# Patient Record
Sex: MALE | Race: BLACK OR AFRICAN AMERICAN | Employment: FULL TIME | URBAN - METROPOLITAN AREA
[De-identification: names, ages, dates, MRNs, and addresses within clinical notes are randomized per-mention and may not be internally consistent; named-entity substitution may affect disease eponyms.]

---

## 2019-08-10 ENCOUNTER — HOSPITAL ENCOUNTER (EMERGENCY)
Age: 29
Discharge: HOME | End: 2019-08-10
Attending: EMERGENCY MEDICINE
Payer: SELF-PAY

## 2019-08-10 ENCOUNTER — APPOINTMENT (OUTPATIENT)
Dept: GENERAL RADIOLOGY | Age: 29
End: 2019-08-10
Attending: EMERGENCY MEDICINE
Payer: SELF-PAY

## 2019-08-10 VITALS
HEIGHT: 67 IN | HEART RATE: 82 BPM | OXYGEN SATURATION: 98 % | WEIGHT: 169 LBS | DIASTOLIC BLOOD PRESSURE: 76 MMHG | SYSTOLIC BLOOD PRESSURE: 128 MMHG | TEMPERATURE: 98.4 F | RESPIRATION RATE: 14 BRPM | BODY MASS INDEX: 26.53 KG/M2

## 2019-08-10 DIAGNOSIS — J20.9 ACUTE BRONCHITIS, UNSPECIFIED ORGANISM: Primary | ICD-10-CM

## 2019-08-10 PROCEDURE — 71046 X-RAY EXAM CHEST 2 VIEWS: CPT

## 2019-08-10 PROCEDURE — 99282 EMERGENCY DEPT VISIT SF MDM: CPT

## 2019-08-10 RX ORDER — ALBUTEROL SULFATE 90 UG/1
2 AEROSOL, METERED RESPIRATORY (INHALATION)
Qty: 1 INHALER | Refills: 1 | Status: SHIPPED | OUTPATIENT
Start: 2019-08-10 | End: 2020-12-03

## 2019-08-10 RX ORDER — PREDNISONE 20 MG/1
60 TABLET ORAL DAILY
Qty: 15 TAB | Refills: 0 | Status: SHIPPED | OUTPATIENT
Start: 2019-08-10 | End: 2019-08-15

## 2019-08-10 NOTE — ED PROVIDER NOTES
EMERGENCY DEPARTMENT HISTORY AND PHYSICAL EXAM      Date: 8/10/2019  Patient Name: Pham Betts    History of Presenting Illness     Chief Complaint   Patient presents with    Cough     coughing x2 weeks, thought maybe seasonal allergies or new cat, concern when cough blood this morning       History Provided By: Patient    HPI: Pham Betts, 25 y.o. male with no significant PMHx, presents ambulatory to the ED with cc of a persistent, yellow-brown sputum productive cough with associated intermittent SOB x 2 weeks, with specks of BRB in his sputum this morning. Pt denies a hx of asthma. He denies any recent sick contact. Pt notes that he smokes tobacco daily and drinks EtOH occasionally, however denies drug use. He denies taking any OTC medications for his sx's. Pt denies any alleviating/exacerbating factors. He specifically denies any fever, chills, CP, HA, N/V/D, abdominal pain, or rash. There are no other complaints, changes, or physical findings at this time. Social Hx: + EtOH; + Smoker; - Illicit Drugs     PCP: Alex, MD Kirsty        Past History     Past Medical History:  History reviewed. No pertinent past medical history. Past Surgical History:  History reviewed. No pertinent surgical history. Family History:  History reviewed. No pertinent family history. Social History:  Social History     Tobacco Use    Smoking status: Not on file   Substance Use Topics    Alcohol use: Not on file    Drug use: Not on file       Allergies:  No Known Allergies    Review of Systems   Review of Systems   Constitutional: Negative for chills and fever. HENT: Negative for sore throat and trouble swallowing. Eyes: Negative for photophobia and redness. Respiratory: Positive for cough (blood in sputum this morning, 2 weeks of productive cough) and shortness of breath. Cardiovascular: Negative for chest pain and leg swelling.    Gastrointestinal: Negative for abdominal pain, constipation, diarrhea, nausea and vomiting. Endocrine: Negative for polydipsia and polyuria. Genitourinary: Negative for dysuria, hematuria and scrotal swelling. Musculoskeletal: Negative for back pain and joint swelling. Skin: Negative for rash. Neurological: Negative for dizziness, syncope, weakness and headaches. Psychiatric/Behavioral: Negative for suicidal ideas. All other systems reviewed and are negative. Physical Exam   Physical Exam   Constitutional: He is oriented to person, place, and time. He appears well-developed and well-nourished. HENT:   Head: Normocephalic and atraumatic. Mouth/Throat: Oropharynx is clear and moist and mucous membranes are normal.   Eyes: EOM are normal.   Neck: Normal range of motion and full passive range of motion without pain. Neck supple. Cardiovascular: Normal rate, regular rhythm, normal heart sounds, intact distal pulses and normal pulses. No murmur heard. Pulmonary/Chest: Effort normal. No respiratory distress. He has no wheezes. He has rhonchi. He exhibits no tenderness. Good air entry BL with some rhonchi   Abdominal: Soft. Normal appearance and bowel sounds are normal. There is no tenderness. There is no rebound and no guarding. Neurological: He is alert and oriented to person, place, and time. He has normal strength. Skin: Skin is warm, dry and intact. No rash noted. No erythema. Psychiatric: He has a normal mood and affect. His speech is normal and behavior is normal. Judgment and thought content normal.   Nursing note and vitals reviewed. Diagnostic Study Results     Labs -   No results found for this or any previous visit (from the past 12 hour(s)). Radiologic Studies -   XR CHEST PA LAT   Final Result   IMPRESSION:    Clear lungs. CT Results  (Last 48 hours)    None        CXR Results  (Last 48 hours)               08/10/19 1017  XR CHEST PA LAT Final result    Impression:  IMPRESSION:    Clear lungs.        Narrative:  PA AND LATERAL CHEST RADIOGRAPHS: 8/10/2019 10:17 AM       INDICATION: Cough. COMPARISON: None. TECHNIQUE: Frontal and lateral radiographs of the chest.       FINDINGS:   The lungs are clear. The central airways are patent. The heart size is normal.   No pneumothorax or pleural effusion. Medical Decision Making   I am the first provider for this patient. I reviewed the vital signs, available nursing notes, past medical history, past surgical history, family history and social history. Vital Signs-Reviewed the patient's vital signs. Patient Vitals for the past 12 hrs:   Temp Pulse Resp BP SpO2   08/10/19 0943 98.4 °F (36.9 °C) 82 14 128/76 98 %       Pulse Oximetry Analysis - 98% on RA    Cardiac Monitor:   Rate: 82 bpm  Rhythm: Normal Sinus Rhythm      Records Reviewed: Nursing Notes    Provider Notes (Medical Decision Making):   DDx: bronchitis, PNA, TB, lung CA    ED Course:   Initial assessment performed. The patients presenting problems have been discussed, and they are in agreement with the care plan formulated and outlined with them. I have encouraged them to ask questions as they arise throughout their visit. Critical Care Time:   None    Disposition:  Discharge Note:  10:26 AM  The patient has been re-evaluated and is ready for discharge. Reviewed available results with patient. Counseled patient/parent/guardian on diagnosis and care plan. Patient has expressed understanding, and all questions have been answered. Patient agrees with plan and agrees to follow up as recommended, or return to the ED if their symptoms worsen. Discharge instructions have been provided and explained to the patient, along with reasons to return to the ED. PLAN:  1.    Current Discharge Medication List      START taking these medications    Details   albuterol (PROVENTIL HFA, VENTOLIN HFA, PROAIR HFA) 90 mcg/actuation inhaler Take 2 Puffs by inhalation every four (4) hours as needed for Wheezing. Qty: 1 Inhaler, Refills: 1      predniSONE (DELTASONE) 20 mg tablet Take 60 mg by mouth daily for 5 days. Qty: 15 Tab, Refills: 0      dextromethorphan-guaiFENesin (ROBITUSSIN COUGH-CHEST CUATE DM)  mg/5 mL liqd syrup Take 10 mL by mouth every eight (8) hours as needed for Cough for up to 5 days. Qty: 1 Bottle, Refills: 0           2. Follow-up Information     Follow up With Specialties Details Why [facility] EMERGENCY DEPT Emergency Medicine In 1 week  [address] [address]  [phone]        Return to ED if worse     Diagnosis     Clinical Impression:   1. Acute bronchitis, unspecified organism        This note is prepared by Sofya Owen, acting as Scribe for MD Ivan Cano MD: The scribe's documentation has been prepared under my direction and personally reviewed by me in its entirety. I confirm that the note above accurately reflects all work, treatment, procedures, and medical decision making performed by me. This note will not be viewable in [address]

## 2019-08-10 NOTE — ED NOTES
Patient (s) 1 given copy of dc instructions and 2 paper script(s) and  electronic scripts. Patient (s)  verbalized understanding of instructions and script (s). Patient given a current medication reconciliation form and verbalized understanding of their medications. Patient (s) verbalized understanding of the importance of discussing medications with  his or her physician or clinic they will be following up with. Patient alert and oriented and in no acute distress.

## 2019-08-10 NOTE — ED NOTES
Patient presents to ED with c/o cough for 2 weeks. Current every day smoker. States that he was told he may have asthma but not on any medication. Emergency Department Nursing Plan of Care       The Nursing Plan of Care is developed from the Nursing assessment and Emergency Department Attending provider initial evaluation. The plan of care may be reviewed in the ED Provider note.     The Plan of Care was developed with the following considerations:   Patient / Family readiness to learn indicated by:verbalized understanding and successful return demonstration  Persons(s) to be included in education: patient  Barriers to Learning/Limitations:No    Signed     Tyler Grier RN    8/10/2019   10:04 AM

## 2019-08-10 NOTE — DISCHARGE INSTRUCTIONS
Patient Education        Bronchitis: Care Instructions  Your Care Instructions    Bronchitis is inflammation of the bronchial tubes, which carry air to the lungs. The tubes swell and produce mucus, or phlegm. The mucus and inflamed bronchial tubes make you cough. You may have trouble breathing. Most cases of bronchitis are caused by viruses like those that cause colds. Antibiotics usually do not help and they may be harmful. Bronchitis usually develops rapidly and lasts about 2 to 3 weeks in otherwise healthy people. Follow-up care is a key part of your treatment and safety. Be sure to make and go to all appointments, and call your doctor if you are having problems. It's also a good idea to know your test results and keep a list of the medicines you take. How can you care for yourself at home? · Take all medicines exactly as prescribed. Call your doctor if you think you are having a problem with your medicine. · Get some extra rest.  · Take an over-the-counter pain medicine, such as acetaminophen (Tylenol), ibuprofen (Advil, Motrin), or naproxen (Aleve) to reduce fever and relieve body aches. Read and follow all instructions on the label. · Do not take two or more pain medicines at the same time unless the doctor told you to. Many pain medicines have acetaminophen, which is Tylenol. Too much acetaminophen (Tylenol) can be harmful. · Take an over-the-counter cough medicine that contains dextromethorphan to help quiet a dry, hacking cough so that you can sleep. Avoid cough medicines that have more than one active ingredient. Read and follow all instructions on the label. · Breathe moist air from a humidifier, hot shower, or sink filled with hot water. The heat and moisture will thin mucus so you can cough it out. · Do not smoke. Smoking can make bronchitis worse. If you need help quitting, talk to your doctor about stop-smoking programs and medicines.  These can increase your chances of quitting for good.  When should you call for help? Call 911 anytime you think you may need emergency care. For example, call if:    · You have severe trouble breathing.    Call your doctor now or seek immediate medical care if:    · You have new or worse trouble breathing.     · You cough up dark brown or bloody mucus (sputum).     · You have a new or higher fever.     · You have a new rash.    Watch closely for changes in your health, and be sure to contact your doctor if:    · You cough more deeply or more often, especially if you notice more mucus or a change in the color of your mucus.     · You are not getting better as expected. Where can you learn more? Go to http://ama-martita.info/. Enter H333 in the search box to learn more about \"Bronchitis: Care Instructions. \"  Current as of: September 5, 2018  Content Version: 12.1  © 5034-3793 Healthwise, Incorporated. Care instructions adapted under license by [employer] (which disclaims liability or warranty for this information). If you have questions about a medical condition or this instruction, always ask your healthcare professional. [address] any warranty or liability for your use of this information.

## 2020-11-06 ENCOUNTER — HOSPITAL ENCOUNTER (EMERGENCY)
Age: 30
Discharge: HOME | End: 2020-11-06
Attending: EMERGENCY MEDICINE

## 2020-11-06 VITALS
WEIGHT: 171 LBS | RESPIRATION RATE: 14 BRPM | SYSTOLIC BLOOD PRESSURE: 119 MMHG | OXYGEN SATURATION: 99 % | DIASTOLIC BLOOD PRESSURE: 64 MMHG | HEART RATE: 71 BPM | TEMPERATURE: 98 F | HEIGHT: 67 IN | BODY MASS INDEX: 26.84 KG/M2

## 2020-11-06 DIAGNOSIS — L03.012 PARONYCHIA OF LEFT INDEX FINGER: Primary | ICD-10-CM

## 2020-11-06 PROCEDURE — 99283 EMERGENCY DEPT VISIT LOW MDM: CPT

## 2020-11-06 PROCEDURE — 75810000289 HC I&D ABSCESS SIMP/COMP/MULT

## 2020-11-06 PROCEDURE — 74011000250 HC RX REV CODE- 250: Performed by: PHYSICIAN ASSISTANT

## 2020-11-06 RX ORDER — IBUPROFEN 800 MG/1
800 TABLET ORAL
Qty: 20 TAB | Refills: 0 | Status: SHIPPED | OUTPATIENT
Start: 2020-11-06 | End: 2020-11-13

## 2020-11-06 RX ORDER — LIDOCAINE HYDROCHLORIDE 10 MG/ML
5 INJECTION, SOLUTION EPIDURAL; INFILTRATION; INTRACAUDAL; PERINEURAL
Status: COMPLETED | OUTPATIENT
Start: 2020-11-06 | End: 2020-11-06

## 2020-11-06 RX ORDER — CEPHALEXIN 500 MG/1
500 CAPSULE ORAL 4 TIMES DAILY
Qty: 28 CAP | Refills: 0 | Status: SHIPPED | OUTPATIENT
Start: 2020-11-06 | End: 2020-11-13

## 2020-11-06 RX ORDER — BUPIVACAINE HYDROCHLORIDE 5 MG/ML
5 INJECTION, SOLUTION EPIDURAL; INTRACAUDAL
Status: COMPLETED | OUTPATIENT
Start: 2020-11-06 | End: 2020-11-06

## 2020-11-06 RX ADMIN — BUPIVACAINE HYDROCHLORIDE 25 MG: 5 INJECTION, SOLUTION EPIDURAL; INTRACAUDAL; PERINEURAL at 10:30

## 2020-11-06 RX ADMIN — LIDOCAINE HYDROCHLORIDE 5 ML: 10 INJECTION, SOLUTION EPIDURAL; INFILTRATION; INTRACAUDAL; PERINEURAL at 10:30

## 2020-11-06 NOTE — LETTER
[facility] EMERGENCY DEPT 
[address] [address] 
[phone] Work/School Note Date: 11/6/2020 To Whom It May concern: 
 
Edith Moeller was seen and treated today in the emergency room by the following provider(s): 
Attending Provider: Bella Madrid MD 
Physician Assistant: Lydia Scales PA-C. Edith Moeller may return to work on today. Sincerely, Katlyn Weinstein PA-C

## 2020-11-06 NOTE — DISCHARGE INSTRUCTIONS
Patient Education        Paronychia: Care Instructions  Your Care Instructions  Paronychia (say \"ckzd-ei-JM-may-uh\") is an infection of the skin around a fingernail or toenail. It happens when germs enter through a break in the skin. The doctor may have made a small cut in the infected area to drain the pus. Most cases of paronychia improve in a few days. But watch your symptoms and follow your doctor's advice. Though rare, a mild case can turn into something more serious and infect your entire finger or toe. Also, it is possible for an infection to return. Follow-up care is a key part of your treatment and safety. Be sure to make and go to all appointments, and call your doctor if you are having problems. It's also a good idea to know your test results and keep a list of the medicines you take. How can you care for yourself at home? · If your doctor told you how to care for your infected nail, follow the doctor's instructions. If you did not get instructions, follow this general advice:  ? Wash the area with clean water 2 times a day. Don't use hydrogen peroxide or alcohol, which can slow healing. ? You may cover the area with a thin layer of petroleum jelly, such as Vaseline, and a nonstick bandage. ? Apply more petroleum jelly and replace the bandage as needed. · If your doctor prescribed antibiotics, take them as directed. Do not stop taking them just because you feel better. You need to take the full course of antibiotics. · Take an over-the-counter pain medicine, such as acetaminophen (Tylenol), ibuprofen (Advil, Motrin), or naproxen (Aleve). Read and follow all instructions on the label. · Do not take two or more pain medicines at the same time unless the doctor told you to. Many pain medicines have acetaminophen, which is Tylenol. Too much acetaminophen (Tylenol) can be harmful. · Prop up the toe or finger so that it is higher than the level of your heart.  This will help with pain and swelling. · Apply heat. Put a warm water bottle, heating pad set on low, or warm cloth on your finger or toe. Do not go to sleep with a heating pad on your skin. · Soak the area in warm water twice a day for 15 minutes each time. After soaking, dry the area well and apply a thin layer of petroleum jelly, such as Vaseline. Put on a new bandage. When should you call for help? Call your doctor now or seek immediate medical care if:    · You have signs of new or worsening infection, such as:  ? Increased pain, swelling, warmth, or redness. ? Red streaks leading from the infected skin. ? Pus draining from the area. ? A fever. Watch closely for changes in your health, and be sure to contact your doctor if:    · You do not get better as expected. Where can you learn more? Go to http://ama-martita.info/  Enter C435 in the search box to learn more about \"Paronychia: Care Instructions. \"  Current as of: July 2, 2020               Content Version: 12.6  © 1772-0739 [employer]. Care instructions adapted under license by [employer] (which disclaims liability or warranty for this information). If you have questions about a medical condition or this instruction, always ask your healthcare professional. [address] any warranty or liability for your use of this information.

## 2020-11-06 NOTE — ED PROVIDER NOTES
EMERGENCY DEPARTMENT HISTORY AND PHYSICAL EXAM    Date: 11/6/2020  Patient Name: Jessica Correa    History of Presenting Illness     Chief Complaint   Patient presents with    Finger Pain         History Provided By: Patient    HPI: Jessica Correa is a 30 y.o. male with No significant past medical history who presents with left index finger pain x4 days. Patient states while at work he got his finger caught in between 2 boxes and did not have any pain initially but since then he woke up the next day and noticed some swelling and pain around the nailbed. Patient states he does bite his fingers but does not think he had a hangnail in the area. Patient denies any fevers or chills. Patient has not tried anything for symptoms. PCP: Other, MD Kirsty    Current Facility-Administered Medications   Medication Dose Route Frequency Provider Last Rate Last Dose    lidocaine (PF) (XYLOCAINE) 10 mg/mL (1 %) injection 5 mL  5 mL SubCUTAneous NOW Michele Reveles PA-C        bupivacaine (PF) (MARCAINE) 0.5 % (5 mg/mL) injection 25 mg  5 mL Peripheral Nerve Block NOW Kirill Reveles PA-C         Current Outpatient Medications   Medication Sig Dispense Refill    cephALEXin (Keflex) 500 mg capsule Take 1 Cap by mouth four (4) times daily for 7 days. 28 Cap 0    ibuprofen (MOTRIN) 800 mg tablet Take 1 Tab by mouth every six (6) hours as needed for Pain for up to 7 days. 20 Tab 0    albuterol (PROVENTIL HFA, VENTOLIN HFA, PROAIR HFA) 90 mcg/actuation inhaler Take 2 Puffs by inhalation every four (4) hours as needed for Wheezing. 1 Inhaler 1    albuterol (PROVENTIL HFA, VENTOLIN HFA, PROAIR HFA) 90 mcg/actuation inhaler Take 2 Puffs by inhalation every four (4) hours as needed for Wheezing. 1 Inhaler 1       Past History     Past Medical History:  No past medical history on file. Past Surgical History:  No past surgical history on file. Family History:  No family history on file.     Social History:  Social History     Tobacco Use    Smoking status: Not on file   Substance Use Topics    Alcohol use: Not on file    Drug use: Not on file       Allergies:  No Known Allergies      Review of Systems   Review of Systems   Constitutional: Negative for chills and fever. Musculoskeletal: Positive for myalgias. Skin: Positive for color change. Neurological: Negative for speech difficulty and weakness. All other systems reviewed and are negative. Physical Exam     Vitals:    11/06/20 0943   BP: 119/64   Pulse: 71   Resp: 14   Temp: 98 °F (36.7 °C)   SpO2: 99%   Weight: 77.6 kg (171 lb)   Height: 5' 7\" (1.702 m)     Physical Exam  Vitals signs and nursing note reviewed. Constitutional:       General: He is not in acute distress. Appearance: He is well-developed. HENT:      Head: Normocephalic and atraumatic. Mouth/Throat:      Pharynx: No oropharyngeal exudate. Eyes:      Conjunctiva/sclera: Conjunctivae normal.   Cardiovascular:      Rate and Rhythm: Normal rate and regular rhythm. Heart sounds: Normal heart sounds. Pulmonary:      Effort: Pulmonary effort is normal. No respiratory distress. Breath sounds: Normal breath sounds. No wheezing or rales. Musculoskeletal: Normal range of motion. Left hand: He exhibits swelling ( Minimal swelling noted to the left index finger around the nailbed with mild erythema and TTP along the lateral edge with purulent discoloration associated). Skin:     General: Skin is warm and dry. Neurological:      Mental Status: He is alert and oriented to person, place, and time. Diagnostic Study Results     Labs -   No results found for this or any previous visit (from the past 12 hour(s)). Radiologic Studies -   No orders to display     CT Results  (Last 48 hours)    None        CXR Results  (Last 48 hours)    None            Medical Decision Making   I am the first provider for this patient.     I reviewed the vital signs, available nursing notes, past medical history, past surgical history, family history and social history. Vital Signs-Reviewed the patient's vital signs. Records Reviewed: Old Medical Records    Disposition:  Discharged    DISCHARGE NOTE:   11:49 AM      Care plan outlined and precautions discussed. Patient has no new complaints, changes, or physical findings. All medications were reviewed with the patient; will d/c home. All of pt's questions and concerns were addressed. Patient was instructed and agrees to follow up with PCP, as well as to return to the ED upon further deterioration. Patient is ready to go home. Follow-up Information     Follow up With Specialties Details Why [facility]  Schedule an appointment as soon as possible for a visit in 1 week As needed [facility] [place], [address]  [phone]          Current Discharge Medication List      START taking these medications    Details   cephALEXin (Keflex) 500 mg capsule Take 1 Cap by mouth four (4) times daily for 7 days. Qty: 28 Cap, Refills: 0      ibuprofen (MOTRIN) 800 mg tablet Take 1 Tab by mouth every six (6) hours as needed for Pain for up to 7 days. Qty: 20 Tab, Refills: 0         CONTINUE these medications which have NOT CHANGED    Details   !! albuterol (PROVENTIL HFA, VENTOLIN HFA, PROAIR HFA) 90 mcg/actuation inhaler Take 2 Puffs by inhalation every four (4) hours as needed for Wheezing. Qty: 1 Inhaler, Refills: 1      !! albuterol (PROVENTIL HFA, VENTOLIN HFA, PROAIR HFA) 90 mcg/actuation inhaler Take 2 Puffs by inhalation every four (4) hours as needed for Wheezing. Qty: 1 Inhaler, Refills: 1       !! - Potential duplicate medications found. Please discuss with provider. Provider Notes (Medical Decision Making):   Patient presents with left index finger pain x4 days.   Likely paronychia although contusion, sprain and less likely fracture on differential.  Do not feel x-rays warranted at this time as there is obvious signs of an infection with erythema and purulent discoloration under the nail. Will do I&D, send home with oral antibiotics and NSAIDs. Procedures:  I&D Abcess Simple    Date/Time: 11/6/2020 11:31 AM  Performed by: Paola San PA-C  Authorized by: Paola San PA-C     Consent:     Consent obtained:  Verbal and written    Consent given by:  Patient    Risks discussed:  Pain  Location:     Indications for incision and drainage: paronychia of the L index finger. Location:  Upper extremity    Upper extremity location:  Finger    Finger location:  L index finger  Pre-procedure details:     Skin preparation:  Betadine  Anesthesia (see MAR for exact dosages): Anesthesia method:  Nerve block    Block needle gauge:  25 G    Block anesthetic:  Bupivacaine 0.5% w/o epi and lidocaine 1% w/o epi    Block technique:  Volar aspect    Block injection procedure:  Introduced needle, anatomic landmarks identified, anatomic landmarks palpated, negative aspiration for blood and incremental injection    Block outcome:  Anesthesia achieved  Procedure type:     Complexity:  Simple  Procedure details:     Incision types:  Single straight    Incision depth:  Subcutaneous    Scalpel blade:  11    Wound management:  Irrigated with saline    Drainage:  Purulent    Drainage amount:  Scant    Wound treatment:  Wound left open    Packing materials:  None  Post-procedure details:     Patient tolerance of procedure: Tolerated well, no immediate complications        Please note that this dictation was completed with Dragon, computer voice recognition software. Quite often unanticipated grammatical, syntax, homophones, and other interpretive errors are inadvertently transcribed by the computer software. Please disregard these errors.   Additionally, please excuse any errors that have escaped final proofreading. Diagnosis     Clinical Impression:   1.  Paronychia of left index finger

## 2020-11-06 NOTE — Clinical Note
[facility] EMERGENCY DEPT 
[address] [facility] [address] 
[phone] Work/School Note Date: 11/6/2020 To Whom It May concern: 
 
 
Diego Cortez was seen and treated today in the emergency room by the following provider(s): 
Attending Provider: Jeffery Moreland MD 
Physician Assistant: Neptali Kovacs PA-C. Diego Cortez is excused from work/school on 11/06/20. He is clear to return to work/school on 11/07/20. Sincerely, Isidra Hernandez PA-C

## 2020-12-03 ENCOUNTER — HOSPITAL ENCOUNTER (EMERGENCY)
Age: 30
Discharge: HOME | End: 2020-12-03
Attending: EMERGENCY MEDICINE | Admitting: EMERGENCY MEDICINE

## 2020-12-03 ENCOUNTER — APPOINTMENT (OUTPATIENT)
Dept: GENERAL RADIOLOGY | Age: 30
End: 2020-12-03
Attending: EMERGENCY MEDICINE

## 2020-12-03 VITALS
BODY MASS INDEX: 26.68 KG/M2 | SYSTOLIC BLOOD PRESSURE: 120 MMHG | DIASTOLIC BLOOD PRESSURE: 78 MMHG | HEIGHT: 67 IN | OXYGEN SATURATION: 100 % | HEART RATE: 80 BPM | RESPIRATION RATE: 16 BRPM | WEIGHT: 170 LBS | TEMPERATURE: 98.4 F

## 2020-12-03 DIAGNOSIS — M25.511 PAIN IN JOINT OF RIGHT SHOULDER: ICD-10-CM

## 2020-12-03 DIAGNOSIS — M19.011 OSTEOARTHRITIS OF RIGHT SHOULDER, UNSPECIFIED OSTEOARTHRITIS TYPE: Primary | ICD-10-CM

## 2020-12-03 PROCEDURE — 73030 X-RAY EXAM OF SHOULDER: CPT

## 2020-12-03 PROCEDURE — 99283 EMERGENCY DEPT VISIT LOW MDM: CPT

## 2020-12-03 RX ORDER — IBUPROFEN 600 MG/1
600 TABLET ORAL
Qty: 20 TAB | Refills: 0 | Status: SHIPPED | OUTPATIENT
Start: 2020-12-03 | End: 2021-08-20 | Stop reason: SDUPTHER

## 2020-12-03 NOTE — LETTER
[facility] EMERGENCY DEPT 
[address] [address] 
[phone] Work/School Note Date: 12/3/2020 To Whom It May concern: 
 
Diego Cortez was seen and treated today in the emergency room by the following: Dr. Suzanne Peña MD. 
 
Diego Cortez may return to work on 12/07/2020 with no restrictions.  
 
Sincerely, 
 
 
 
 
Constantine Valle MD

## 2020-12-03 NOTE — ED NOTES
Pt arrives to the ED AAOX4 with a c/c of right shoulder pain onset one week ago. Pt states he was playing football and had an injury where his shoulder got dislocated but he was able to force it back into place. Pain improved but after working lifting heavy boxes it has gotten worse. Pt is noted in stable condition, now in ED room with side rail up, bed to lowest position and call light within reach. Will continue to monitor and wait for EDP evaluation. Emergency Department Nursing Plan of Care       The Nursing Plan of Care is developed from the Nursing assessment and Emergency Department Attending provider initial evaluation. The plan of care may be reviewed in the ED Provider note.     The Plan of Care was developed with the following considerations:   Patient / Family readiness to learn indicated by:verbalized understanding  Persons(s) to be included in education: patient  Barriers to Learning/Limitations:No    Signed     Naldo Nogueira    12/3/2020   6:06 PM

## 2020-12-03 NOTE — DISCHARGE INSTRUCTIONS
Patient Education        Shoulder Arthritis: Exercises  Introduction  Here are some examples of exercises for you to try. The exercises may be suggested for a condition or for rehabilitation. Start each exercise slowly. Ease off the exercises if you start to have pain. You will be told when to start these exercises and which ones will work best for you. How to do the exercises  Shoulder flexion (lying down)   To make a wand for this exercise, use a piece of PVC pipe or a broom handle with the broom removed. Make the wand about a foot wider than your shoulders. 1. Lie on your back, holding a wand with both hands. Your palms should face down as you hold the wand. 2. Keeping your elbows straight, slowly raise your arms over your head. Raise them until you feel a stretch in your shoulders, upper back, and chest.  3. Hold for 15 to 30 seconds. 4. Repeat 2 to 4 times. Shoulder rotation (lying down)   To make a wand for this exercise, use a piece of PVC pipe or a broom handle with the broom removed. Make the wand about a foot wider than your shoulders. 1. Lie on your back. Hold a wand with both hands with your elbows bent and palms up. 2. Keep your elbows close to your body, and move the wand across your body toward the sore arm. 3. Hold for 8 to 12 seconds. 4. Repeat 2 to 4 times. Shoulder internal rotation with towel   1. Hold a towel above and behind your head with the arm that is not sore. 2. With your sore arm, reach behind your back and grasp the towel. 3. With the arm above your head, pull the towel upward. Do this until you feel a stretch on the front and outside of your sore shoulder. 4. Hold 15 to 30 seconds. 5. Repeat 2 to 4 times. Shoulder blade squeeze   1. Stand with your arms at your sides, and squeeze your shoulder blades together. Do not raise your shoulders up as you squeeze. 2. Hold 6 seconds. 3. Repeat 8 to 12 times.     Resisted rows   For this exercise, you will need elastic exercise material, such as surgical tubing or Thera-Band. 1. Put the band around a solid object at about waist level. (A bedpost will work well.) Each hand should hold an end of the band. 2. With your elbows at your sides and bent to 90 degrees, pull the band back. Your shoulder blades should move toward each other. Return to the starting position. 3. Repeat 8 to 12 times. External rotator strengthening exercise   1. Start by tying a piece of elastic exercise material to a doorknob. You can use surgical tubing or Thera-Band. (You may also hold one end of the band in each hand.)  2. Stand or sit with your shoulder relaxed and your elbow bent 90 degrees. Your upper arm should rest comfortably against your side. Squeeze a rolled towel between your elbow and your body for comfort. This will help keep your arm at your side. 3. Hold one end of the elastic band with the hand of the painful arm. 4. Start with your forearm across your belly. Slowly rotate the forearm out away from your body. Keep your elbow and upper arm tucked against the towel roll or the side of your body until you begin to feel tightness in your shoulder. Slowly move your arm back to where you started. 5. Repeat 8 to 12 times. Internal rotator strengthening exercise   1. Start by tying a piece of elastic exercise material to a doorknob. You can use surgical tubing or Thera-Band. 2. Stand or sit with your shoulder relaxed and your elbow bent 90 degrees. Your upper arm should rest comfortably against your side. Squeeze a rolled towel between your elbow and your body for comfort. This will help keep your arm at your side. 3. Hold one end of the elastic band in the hand of the painful arm. 4. Slowly rotate your forearm toward your body until it touches your belly. Slowly move it back to where you started. 5. Keep your elbow and upper arm firmly tucked against the towel roll or at your side. 6. Repeat 8 to 12 times.     Pendulum swing   If you have pain in your back, do not do this exercise. 1. Hold on to a table or the back of a chair with your good arm. Then bend forward a little and let your sore arm hang straight down. This exercise does not use the arm muscles. Rather, use your legs and your hips to create movement that makes your arm swing freely. 2. Use the movement from your hips and legs to guide the slightly swinging arm back and forth like a pendulum (or elephant trunk). Then guide it in circles that start small (about the size of a dinner plate). Make the circles a bit larger each day, as your pain allows. 3. Do this exercise for 5 minutes, 5 to 7 times each day. 4. As you have less pain, try bending over a little farther to do this exercise. This will increase the amount of movement at your shoulder. Follow-up care is a key part of your treatment and safety. Be sure to make and go to all appointments, and call your doctor if you are having problems. It's also a good idea to know your test results and keep a list of the medicines you take. Where can you learn more? Go to http://www.gray.com/  Enter H562 in the search box to learn more about \"Shoulder Arthritis: Exercises. \"  Current as of: March 2, 2020               Content Version: 12.6  © 5048-3039 Healthwise, Incorporated. Care instructions adapted under license by [employer] (which disclaims liability or warranty for this information). If you have questions about a medical condition or this instruction, always ask your healthcare professional. [place] [address] any warranty or liability for your use of this information. Patient Education        Shoulder Pain: Care Instructions  Your Care Instructions     You can hurt your shoulder by using it too much during an activity, such as fishing or baseball. It can also happen as part of the everyday wear and tear of getting older.  Shoulder injuries can be slow to heal, but your shoulder should get better with time. Your doctor may recommend a sling to rest your shoulder. If you have injured your shoulder, you may need testing and treatment. Follow-up care is a key part of your treatment and safety. Be sure to make and go to all appointments, and call your doctor if you are having problems. It's also a good idea to know your test results and keep a list of the medicines you take. How can you care for yourself at home? · Take pain medicines exactly as directed. ? If the doctor gave you a prescription medicine for pain, take it as prescribed. ? If you are not taking a prescription pain medicine, ask your doctor if you can take an over-the-counter medicine. ? Do not take two or more pain medicines at the same time unless the doctor told you to. Many pain medicines contain acetaminophen, which is Tylenol. Too much acetaminophen (Tylenol) can be harmful. · If your doctor recommends that you wear a sling, use it as directed. Do not take it off before your doctor tells you to. · Put ice or a cold pack on the sore area for 10 to 20 minutes at a time. Put a thin cloth between the ice and your skin. · If there is no swelling, you can put moist heat, a heating pad, or a warm cloth on your shoulder. Some doctors suggest alternating between hot and cold. · Rest your shoulder for a few days. If your doctor recommends it, you can then begin gentle exercise of the shoulder, but do not lift anything heavy. When should you call for help? Call 911 anytime you think you may need emergency care. For example, call if:    · You have chest pain or pressure. This may occur with:  ? Sweating. ? Shortness of breath. ? Nausea or vomiting. ? Pain that spreads from the chest to the neck, jaw, or one or both shoulders or arms. ? Dizziness or lightheadedness. ? A fast or uneven pulse. After calling 911, chew 1 adult-strength aspirin. Wait for an ambulance.  Do not try to drive yourself.     · Your arm or hand is cool or pale or changes color. Call your doctor now or seek immediate medical care if:    · You have signs of infection, such as:  ? Increased pain, swelling, warmth, or redness in your shoulder. ? Red streaks leading from a place on your shoulder. ? Pus draining from an area of your shoulder. ? Swollen lymph nodes in your neck, armpits, or groin. ? A fever. Watch closely for changes in your health, and be sure to contact your doctor if:    · You cannot use your shoulder.     · Your shoulder does not get better as expected. Where can you learn more? Go to http://www.aguiar.com/  Enter H996 in the search box to learn more about \"Shoulder Pain: Care Instructions. \"  Current as of: March 2, 2020               Content Version: 12.6  © 6015-3822 Healthwise, Incorporated. Care instructions adapted under license by [employer] (which disclaims liability or warranty for this information). If you have questions about a medical condition or this instruction, always ask your healthcare professional. [address] any warranty or liability for your use of this information.

## 2020-12-03 NOTE — ED TRIAGE NOTES
States his right shoulder popped out and popped back in this past weekend while playing football. Has further aggravated this week while at work.

## 2020-12-04 NOTE — ED NOTES
Discharge instructions provided. Pt was given copy of discharge instructions with 1 paper script(s) and 0 electronic script(s). Pt verbalized understanding of the medication instructions, and the importance of following up as recommended by EDP. Pt has no further questions at this time. Wheelchair offered from treatment area to hospital entrance, pt declined. Pt leaving ED ambulatory and in stable condition.

## 2020-12-07 NOTE — ED NOTES
Received a call from patient stating that there is no light duty at his work and he needs a note stating that he is cleared to return to work today. He feels well enough to return at full duty. Will write a note reflecting this.

## 2020-12-08 NOTE — ED PROVIDER NOTES
EMERGENCY DEPARTMENT HISTORY AND PHYSICAL EXAM      Date: 12/3/2020  Patient Name: Zay Stevenson    History of Presenting Illness     Chief Complaint   Patient presents with    Shoulder Pain       History Provided By: Patient    HPI: Zay Stevenson, 30 y.o. male with no pertinent past medical history presents to the emergency department with complaints of right shoulder pain. Patient states that he was playing football on Sunday when he felt his shoulder \"pop out\". Patient states that he believes it was briefly dislocated and then self relocated. Since then he notes that he has been having a constant pain in the right shoulder that has been worse with lifting at work. Pain is described as an aching pain that does radiate up to his neck. Denies any arm weakness, numbness or tingling. PCP: Kirsty Johnson MD    Current Outpatient Medications   Medication Sig Dispense Refill    ibuprofen (MOTRIN) 600 mg tablet Take 1 Tab by mouth every six (6) hours as needed for Pain. 20 Tab 0       Past History     Past Medical History:  History reviewed. No pertinent past medical history. Past Surgical History:  History reviewed. No pertinent surgical history. Family History:  History reviewed. No pertinent family history. Social History:  Social History     Tobacco Use    Smoking status: Current Some Day Smoker    Smokeless tobacco: Never Used   Substance Use Topics    Alcohol use: Not on file     Comment: sometimes    Drug use: Not on file       Allergies:  No Known Allergies      Review of Systems   Review of Systems  Constitutional: Negative for fever, chills, and fatigue. MSK: Positive shoulder pain    Physical Exam   Physical Exam    GENERAL: alert and oriented, no acute distress  EYES: PEERL, No injection, discharge or icterus. ENT: Mucous membranes pink and moist.  NECK: Supple  LUNGS: Airway patent. Non-labored respirations. HEART: Regular rate and rhythm.    ABDOMEN: Non-distended  SKIN: warm, dry  MSK/EXTREMITIES: Without deformity, mild tenderness over the right shoulder. Neurovascularly intact distally. NEUROLOGICAL: Alert, oriented      Diagnostic Study Results     Labs -   No results found for this or any previous visit (from the past 12 hour(s)). Radiologic Studies -   XR SHOULDER RT AP/LAT MIN 2 V   Final Result   IMPRESSION: No acute abnormality. Glenoid degenerative disease, advanced for   age. CT Results  (Last 48 hours)    None        CXR Results  (Last 48 hours)    None            Medical Decision Making   I, Zahira Lopes MD am the first provider for this patient and am the attending of record for this patient encounter. I reviewed the vital signs, available nursing notes, past medical history, past surgical history, family history and social history. Vital Signs-Reviewed the patient's vital signs. No data found. Records Reviewed: Nursing Notes and Old Medical Records    Provider Notes (Medical Decision Making): On presentation the patient is well appearing, in no acute distress with normal vital signs. Based on the history and exam the differential diagnosis for this patient includes. Dislocation, bursitis, osteoarthritis,tendinopathy, fracture. There are no history or exam findings consistent with septic arthritis. Xray demonstrated glenoid degenerative changes but no acute pathology on my interpretation. . Will provide sling for comfort but instructed on the need for continued mobility and provided instructions on specific stretching that should be done regularly at home. Instructed to follow up with orthopedics this week. Instructed on safe use of NSAIDS along with the possible GI, Cardio and renal side effects of chronic use. ED Course:   Initial assessment performed. The patients presenting problems have been discussed, and they are in agreement with the care plan formulated and outlined with them.   I have encouraged them to ask questions as they arise throughout their visit. PROGRESS  Ra Cortez's  results have been reviewed with him. He has been counseled regarding his diagnosis. He verbally conveys understanding and agreement of the signs, symptoms, diagnosis, treatment and prognosis and additionally agrees to follow up as recommended. He also agrees with the care-plan and conveys that all of his questions have been answered. I have also put together some discharge instructions for him that include: 1) educational information regarding their diagnosis, 2) how to care for their diagnosis at home, as well a 3) list of reasons why they would want to return to the ED prior to their follow-up appointment, should their condition change. Disposition:  home    PLAN:  1. Discharge Medication List as of 12/3/2020  6:52 PM      START taking these medications    Details   ibuprofen (MOTRIN) 600 mg tablet Take 1 Tab by mouth every six (6) hours as needed for Pain., Print, Disp-20 Tab,R-0           2. Follow-up Information     Follow up With Specialties Details Why [address] [facility] EMERGENCY DEPT Emergency Medicine  If symptoms worsen Andrew Hogan    [facility]  Schedule an appointment as soon as possible for a visit in 3 days  [facility]  [address]  [facility] [facility]  [phone]        Return to ED if worse     Diagnosis     Clinical Impression:   1. Osteoarthritis of right shoulder, unspecified osteoarthritis type    2. Pain in joint of right shoulder        Please note that this dictation was completed with Dragon, computer voice recognition software. Quite often unanticipated grammatical, syntax, homophones, and other interpretive errors are inadvertently transcribed by the computer software. Please disregard these errors. Additionally, please excuse any errors that have escaped final proofreading.

## 2021-08-20 ENCOUNTER — HOSPITAL ENCOUNTER (EMERGENCY)
Age: 31
Discharge: HOME | End: 2021-08-20
Attending: EMERGENCY MEDICINE
Payer: COMMERCIAL

## 2021-08-20 VITALS
RESPIRATION RATE: 16 BRPM | WEIGHT: 160 LBS | HEART RATE: 79 BPM | OXYGEN SATURATION: 98 % | HEIGHT: 67 IN | BODY MASS INDEX: 25.11 KG/M2 | TEMPERATURE: 98.4 F | DIASTOLIC BLOOD PRESSURE: 60 MMHG | SYSTOLIC BLOOD PRESSURE: 116 MMHG

## 2021-08-20 DIAGNOSIS — M72.2 PLANTAR FASCIITIS OF LEFT FOOT: Primary | ICD-10-CM

## 2021-08-20 PROCEDURE — 99282 EMERGENCY DEPT VISIT SF MDM: CPT

## 2021-08-20 RX ORDER — IBUPROFEN 600 MG/1
600 TABLET ORAL
Qty: 20 TABLET | Refills: 0 | Status: SHIPPED | OUTPATIENT
Start: 2021-08-20

## 2021-08-20 NOTE — ED PROVIDER NOTES
Atraumatic left heel pain which is worse when he wakes up in the morning or after inactivity and lessens gradually with increased activity. Patient walks a lot for work. Denies trauma, change in activity, new shoes               No past medical history on file. No past surgical history on file. No family history on file. Social History     Socioeconomic History    Marital status: SINGLE     Spouse name: Not on file    Number of children: Not on file    Years of education: Not on file    Highest education level: Not on file   Occupational History    Not on file   Tobacco Use    Smoking status: Current Some Day Smoker    Smokeless tobacco: Never Used   Substance and Sexual Activity    Alcohol use: Not on file     Comment: sometimes    Drug use: Not on file    Sexual activity: Not on file   Other Topics Concern    Not on file   Social History Narrative    Not on file     Social Determinants of Health     Financial Resource Strain:     Difficulty of Paying Living Expenses:    Food Insecurity:     Worried About Running Out of Food in the Last Year:     [address] in the Last Year:    Transportation Needs:     Lack of Transportation (Medical):  Lack of Transportation (Non-Medical):    Physical Activity:     Days of Exercise per Week:     Minutes of Exercise per Session:    Stress:     Feeling of Stress :    Social Connections:     Frequency of Communication with Friends and Family:     Frequency of Social Gatherings with Friends and Family:     Attends Confucianist Services:     Active Member of Clubs or Organizations:     Attends Club or Organization Meetings:     Marital Status:    Intimate Partner Violence:     Fear of Current or Ex-Partner:     Emotionally Abused:     Physically Abused:     Sexually Abused: ALLERGIES: Patient has no known allergies. Review of Systems   Constitutional: Negative. Negative for fever. HENT: Negative.   Negative for drooling, facial swelling and trouble swallowing. Eyes: Negative. Negative for discharge and redness. Respiratory: Negative. Negative for chest tightness, shortness of breath and wheezing. Cardiovascular: Negative. Negative for chest pain. Gastrointestinal: Negative. Negative for abdominal distention, abdominal pain, constipation, diarrhea, nausea and vomiting. Endocrine: Negative. Genitourinary: Negative. Negative for difficulty urinating and dysuria. Musculoskeletal: Positive for arthralgias. Negative for myalgias. Skin: Negative. Negative for color change and rash. Allergic/Immunologic: Negative. Neurological: Negative. Negative for syncope, facial asymmetry and speech difficulty. Hematological: Negative. Psychiatric/Behavioral: Negative. Negative for agitation and confusion. All other systems reviewed and are negative. Vitals:    08/20/21 0214   BP: 116/60   Pulse: 79   Resp: 16   Temp: 98.4 °F (36.9 °C)   SpO2: 98%   Weight: 72.6 kg (160 lb)   Height: 5' 7\" (1.702 m)            Physical Exam  Vitals and nursing note reviewed. Constitutional:       Appearance: Normal appearance. He is well-developed. HENT:      Head: Normocephalic and atraumatic. Eyes:      Conjunctiva/sclera: Conjunctivae normal.   Cardiovascular:      Rate and Rhythm: Normal rate and regular rhythm. Pulmonary:      Effort: No accessory muscle usage or respiratory distress. Abdominal:      Palpations: Abdomen is soft. Tenderness: There is no abdominal tenderness. Musculoskeletal:         General: Normal range of motion. Cervical back: Neck supple. Comments: Tenderness of inferior heel. No bony deformity, swelling, skin changes     Skin:     General: Skin is warm and dry. Neurological:      Mental Status: He is alert and oriented to person, place, and time. Psychiatric:         Behavior: Behavior normal.         Thought Content:  Thought content normal.          MDM  Number of Diagnoses or Management Options  Plantar fasciitis of left foot  Diagnosis management comments: Will treat as possible plantar fasciitis and patient advised to follow-up with Ortho. Advised him to use a heel lift         Procedures  LABORATORY TESTS:  No results found for this or any previous visit (from the past 12 hour(s)). IMAGING RESULTS:  No orders to display       MEDICATIONS GIVEN:  Medications - No data to display    IMPRESSION:  1. Plantar fasciitis of left foot        PLAN:  1. Discharge Medication List as of 8/20/2021  2:43 AM        2.    Follow-up Information     Follow up With Specialties Details Why Contact Info    Lance Chavez DO Orthopedic Surgery   [address] [place] [address]  [address] [place] [address]  [phone]          Return to ED if worse

## 2021-08-20 NOTE — ED NOTES
Emergency Department Nursing Plan of Care       The Nursing Plan of Care is developed from the Nursing assessment and Emergency Department Attending provider initial evaluation. The plan of care may be reviewed in the ED Provider note.     The Plan of Care was developed with the following considerations:   Patient / Family readiness to learn indicated by:verbalized understanding  Persons(s) to be included in education: patient  Barriers to Learning/Limitations:No    Signed     Bing Catalan RN    8/20/2021   2:16 AM

## 2023-02-03 ENCOUNTER — HOSPITAL ENCOUNTER (EMERGENCY)
Age: 33
Discharge: HOME | End: 2023-02-03
Attending: EMERGENCY MEDICINE
Payer: COMMERCIAL

## 2023-02-03 VITALS
TEMPERATURE: 98 F | HEART RATE: 86 BPM | DIASTOLIC BLOOD PRESSURE: 78 MMHG | HEIGHT: 69 IN | WEIGHT: 160 LBS | RESPIRATION RATE: 18 BRPM | BODY MASS INDEX: 23.7 KG/M2 | SYSTOLIC BLOOD PRESSURE: 130 MMHG | OXYGEN SATURATION: 100 %

## 2023-02-03 DIAGNOSIS — S46.911A STRAIN OF RIGHT SHOULDER, INITIAL ENCOUNTER: Primary | ICD-10-CM

## 2023-02-03 PROCEDURE — 74011250637 HC RX REV CODE- 250/637: Performed by: EMERGENCY MEDICINE

## 2023-02-03 PROCEDURE — 99283 EMERGENCY DEPT VISIT LOW MDM: CPT

## 2023-02-03 RX ORDER — IBUPROFEN 400 MG/1
800 TABLET ORAL
Status: COMPLETED | OUTPATIENT
Start: 2023-02-03 | End: 2023-02-03

## 2023-02-03 RX ADMIN — IBUPROFEN 800 MG: 400 TABLET ORAL at 03:29

## 2023-02-03 NOTE — ED NOTES
Discharge instructions were given to the patient by Mimi Salinas RN. The patient left the Emergency Department ambulatory, alert and oriented and in no acute distress with 0 prescriptions. The patient was encouraged to call or return to the ED for worsening issues or problems and was encouraged to schedule a follow up appointment for continuing care. The patient verbalized understanding of discharge instructions and prescriptions, all questions were answered. The patient has no further concerns at this time.

## 2023-02-03 NOTE — Clinical Note
Lino Ryan was seen and treated in our emergency department on 2/3/2023. sEtuardo Hess may return to work on Monday 2/6/23. He should be limited to only activities he can tolerate without pain until Monday 2/13/23.     Lord Zuleyma MD

## 2023-02-03 NOTE — ED TRIAGE NOTES
Right shoulder pain x today after overworking shoulder as noted by pt. Full ROM reported.  No pain meds taken PTA

## 2023-02-03 NOTE — ED PROVIDER NOTES
[facility] EMERGENCY DEPT  EMERGENCY DEPARTMENT ENCOUNTER       Pt Name: Cornel An  MRN: [id number]  [place] 1990  Date of evaluation: 2/3/2023  Provider: Rubin Lund MD   PCP: Opal Harris MD  Note Started: 3:11 AM 2/3/23     CHIEF COMPLAINT       Chief Complaint   Patient presents with    Shoulder Pain        HISTORY OF PRESENT ILLNESS: 1 or more elements      History From: patient, History limited by:  none     Cornel An is a 27 y.o. male who presents ambulatory to the ED complaining of right shoulder pain. Patient does have history of right shoulder arthritis. Today at work he states he was \"trying to be superman,\" and he pulled something. Was doing lifting and felt sudden onset posterior right shoulder pain. Initially the pain was radiating to his neck. He went home from his first job early and tried resting his shoulder. 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 Has not tried anything over-the-counter for pain. Patient is right-handed. Nursing Notes were all reviewed and agreed with or any disagreements were addressed in the HPI. REVIEW OF SYSTEMS        Positives and Pertinent negatives as per HPI. PAST HISTORY     Past Medical History:  History reviewed. No pertinent past medical history. Past Surgical History:  History reviewed. No pertinent surgical history. Family History:  History reviewed. No pertinent family history.     Social History:  Social History     Tobacco Use    Smoking status: Some Days    Smokeless tobacco: Never   Substance Use Topics    Drug use: Not Currently       Allergies:  No Known Allergies    CURRENT MEDICATIONS      Discharge Medication List as of 2/3/2023  3:23 AM        CONTINUE these medications which have NOT CHANGED    Details   ibuprofen (MOTRIN) 600 mg tablet Take 1 Tablet by mouth every six (6) hours as needed for Pain., Normal, Disp-20 Tablet, R-0             SCREENINGS               No data recorded         PHYSICAL EXAM      ED Triage Vitals    ED Encounter Vitals Group      /78      Pulse (Heart Rate) 86      Resp Rate 18      Temp 98 °F (36.7 °C)      Temp src       O2 Sat (%) 100 %      Weight 160 lb      Height 5' 9\"        Physical Exam  Constitutional:       General: He is not in acute distress. Appearance: Normal appearance. He is normal weight. He is not ill-appearing or toxic-appearing. HENT:      Head: Normocephalic and atraumatic. Eyes:      Pupils: Pupils are equal, round, and reactive to light. Cardiovascular:      Rate and Rhythm: Normal rate. Pulmonary:      Effort: Pulmonary effort is normal.   Musculoskeletal:         General: Tenderness present. No swelling or deformity. Normal range of motion. Right lower leg: No edema. Comments: Full active and passive range of motion of right shoulder. No localized bony tenderness. Generalized palpation of posterior aspect of joint   Skin:     General: Skin is warm and dry. Neurological:      General: No focal deficit present. Mental Status: He is alert and oriented to person, place, and time. Psychiatric:         Mood and Affect: Mood normal.        DIAGNOSTIC RESULTS   LABS:     No results found for this or any previous visit (from the past 12 hour(s)). EKG: If performed, independent interpretation documented below in the MDM section     RADIOLOGY:  Non-plain film images such as CT, Ultrasound and MRI are read by the radiologist. Plain radiographic images are visualized and preliminarily interpreted by the ED Provider with the findings documented in the MDM section. Interpretation per the Radiologist below, if available at the time of this note:     No results found.       PROCEDURES   Unless otherwise noted below, none  Procedures       EMERGENCY DEPARTMENT COURSE and DIFFERENTIAL DIAGNOSIS/MDM   Vitals:    Vitals:    02/03/23 0305   BP: 130/78   Pulse: 86   Resp: 18   Temp: 98 °F (36.7 °C) SpO2: 100%   Weight: 72.6 kg (160 lb)   Height: 5' 9\" (1.753 m)        Patient was given the following medications:  Medications   ibuprofen (MOTRIN) tablet 800 mg (800 mg Oral Given 2/3/23 0329)       Medical Decision Making  Most likely strain, possibly a rotator cuff versus upper lateral back. Will manage conservatively. Risk  Prescription drug management. **PLEASE SEE ED COURSE DETAILS BELOW FOR FURTHER MDM DETAILS:         FINAL IMPRESSION     1. Strain of right shoulder, initial encounter          DISPOSITION/PLAN   Ra Cortez's  results have been reviewed with him. He has been counseled regarding his diagnosis, treatment, and plan. He verbally conveys understanding and agreement of the signs, symptoms, diagnosis, treatment and prognosis and additionally agrees to follow up as discussed. He also agrees with the care-plan and conveys that all of his questions have been answered. I have also provided discharge instructions for him that include: educational information regarding their diagnosis and treatment, and list of reasons why they would want to return to the ED prior to their follow-up appointment, should his condition change. PATIENT REFERRED TO:  Follow-up Information       Follow up With Specialties Details Why Contact Info    [facility]  Schedule an appointment as soon as possible for a visit  As needed to establish primary care [address] [id number] [phone]    [facility] EMERGENCY DEPT Emergency Medicine  As needed, If symptoms worsen [id number]:  Discharge Medication List as of 2/3/2023  3:23 AM            DISCONTINUED MEDICATIONS:  Discharge Medication List as of 2/3/2023  3:23 AM          I am the Primary Clinician of Record.    Rachel Ramires MD (electronically signed)    (Please note that parts of this dictation were completed with voice recognition software. Quite often unanticipated grammatical, syntax, homophones, and other interpretive errors are inadvertently transcribed by the computer software. Please disregards these errors.  Please excuse any errors that have escaped final proofreading.)

## 2023-02-03 NOTE — Clinical Note
Author Brennen was seen and treated in our emergency department on 2/3/2023. Romana Lain may return to work on Monday 2/6/23. He should be limited to only activities he can tolerate without pain until Monday 2/13/23.     Reva Chilel MD

## 2023-02-03 NOTE — Clinical Note
Vinayak Reyes was seen and treated in our emergency department on 2/3/2023. [facility] See may return to work on Monday 2/6/23. He should be limited to only activities he can tolerate without pain until Monday 2/13/23.     Gavi Benítez MD

## 2023-07-18 ENCOUNTER — HOSPITAL ENCOUNTER (EMERGENCY)
Facility: HOSPITAL | Age: 33
Discharge: HOME | End: 2023-07-18
Attending: EMERGENCY MEDICINE
Payer: COMMERCIAL

## 2023-07-18 VITALS
WEIGHT: 188 LBS | HEIGHT: 67 IN | SYSTOLIC BLOOD PRESSURE: 122 MMHG | BODY MASS INDEX: 29.51 KG/M2 | TEMPERATURE: 97.6 F | RESPIRATION RATE: 16 BRPM | DIASTOLIC BLOOD PRESSURE: 79 MMHG | HEART RATE: 66 BPM | OXYGEN SATURATION: 99 %

## 2023-07-18 DIAGNOSIS — Z71.1 CONCERN ABOUT STD IN MALE WITHOUT DIAGNOSIS: Primary | ICD-10-CM

## 2023-07-18 LAB
C TRACH DNA SPEC QL NAA+PROBE: NEGATIVE
N GONORRHOEA DNA SPEC QL NAA+PROBE: NEGATIVE
SAMPLE TYPE: NORMAL
SERVICE CMNT-IMP: NORMAL
SPECIMEN SOURCE: NORMAL

## 2023-07-18 PROCEDURE — 99283 EMERGENCY DEPT VISIT LOW MDM: CPT

## 2023-07-18 PROCEDURE — 87591 N.GONORRHOEAE DNA AMP PROB: CPT

## 2023-07-18 PROCEDURE — 87491 CHLMYD TRACH DNA AMP PROBE: CPT

## 2023-07-18 ASSESSMENT — PAIN - FUNCTIONAL ASSESSMENT: PAIN_FUNCTIONAL_ASSESSMENT: NONE - DENIES PAIN

## 2023-07-18 NOTE — ED TRIAGE NOTES
Pt presents to ER with requests for STD check. Reports exposure to unknown STD. He has no urinary sx, no dysuria, no penile discharge or scrotal pain.
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
## 2024-04-14 ENCOUNTER — HOSPITAL ENCOUNTER (EMERGENCY)
Facility: HOSPITAL | Age: 34
Discharge: HOME | End: 2024-04-14
Attending: EMERGENCY MEDICINE

## 2024-04-14 ENCOUNTER — APPOINTMENT (OUTPATIENT)
Facility: HOSPITAL | Age: 34
End: 2024-04-14

## 2024-04-14 VITALS
RESPIRATION RATE: 18 BRPM | SYSTOLIC BLOOD PRESSURE: 121 MMHG | HEART RATE: 95 BPM | DIASTOLIC BLOOD PRESSURE: 77 MMHG | WEIGHT: 188 LBS | TEMPERATURE: 98.9 F | HEIGHT: 67 IN | OXYGEN SATURATION: 95 % | BODY MASS INDEX: 29.51 KG/M2

## 2024-04-14 DIAGNOSIS — S93.402A SPRAIN OF LEFT ANKLE, UNSPECIFIED LIGAMENT, INITIAL ENCOUNTER: Primary | ICD-10-CM

## 2024-04-14 DIAGNOSIS — Z72.0 TOBACCO ABUSE: ICD-10-CM

## 2024-04-14 DIAGNOSIS — S99.912A INJURY OF LEFT ANKLE, INITIAL ENCOUNTER: ICD-10-CM

## 2024-04-14 DIAGNOSIS — S90.02XA CONTUSION OF LEFT ANKLE, INITIAL ENCOUNTER: ICD-10-CM

## 2024-04-14 PROCEDURE — 99283 EMERGENCY DEPT VISIT LOW MDM: CPT

## 2024-04-14 PROCEDURE — 73610 X-RAY EXAM OF ANKLE: CPT

## 2024-04-14 PROCEDURE — 6370000000 HC RX 637 (ALT 250 FOR IP): Performed by: EMERGENCY MEDICINE

## 2024-04-14 RX ORDER — ACETAMINOPHEN 500 MG
1000 TABLET ORAL
Status: COMPLETED | OUTPATIENT
Start: 2024-04-14 | End: 2024-04-14

## 2024-04-14 RX ORDER — KETOROLAC TROMETHAMINE 30 MG/ML
30 INJECTION, SOLUTION INTRAMUSCULAR; INTRAVENOUS ONCE
Status: DISCONTINUED | OUTPATIENT
Start: 2024-04-14 | End: 2024-04-15 | Stop reason: HOSPADM

## 2024-04-14 RX ORDER — NAPROXEN 500 MG/1
500 TABLET ORAL 2 TIMES DAILY
Qty: 60 TABLET | Refills: 0 | Status: SHIPPED | OUTPATIENT
Start: 2024-04-14

## 2024-04-14 RX ADMIN — ACETAMINOPHEN 1000 MG: 500 TABLET ORAL at 22:03

## 2024-04-14 ASSESSMENT — LIFESTYLE VARIABLES
HOW OFTEN DO YOU HAVE A DRINK CONTAINING ALCOHOL: NEVER
HOW MANY STANDARD DRINKS CONTAINING ALCOHOL DO YOU HAVE ON A TYPICAL DAY: PATIENT DOES NOT DRINK

## 2024-04-14 ASSESSMENT — PAIN - FUNCTIONAL ASSESSMENT: PAIN_FUNCTIONAL_ASSESSMENT: 0-10

## 2024-04-14 ASSESSMENT — PAIN SCALES - GENERAL
PAINLEVEL_OUTOF10: 10
PAINLEVEL_OUTOF10: 10

## 2024-04-14 ASSESSMENT — PAIN DESCRIPTION - ORIENTATION
ORIENTATION: LEFT
ORIENTATION: LEFT

## 2024-04-14 ASSESSMENT — PAIN DESCRIPTION - LOCATION
LOCATION: ANKLE
LOCATION: ANKLE

## 2024-04-14 ASSESSMENT — PAIN DESCRIPTION - DESCRIPTORS
DESCRIPTORS: ACHING
DESCRIPTORS: ACHING

## 2024-04-15 ASSESSMENT — ENCOUNTER SYMPTOMS
EYE PAIN: 0
ABDOMINAL PAIN: 0
VOMITING: 0
SORE THROAT: 0
SHORTNESS OF BREATH: 0
DIARRHEA: 0
COUGH: 0
RHINORRHEA: 0
NAUSEA: 0

## 2024-04-15 NOTE — DISCHARGE INSTRUCTIONS
Thank You!    It was a pleasure taking care of you in our Emergency Department today. We know that when you come to [facility], you are entrusting us with your health, comfort, and safety. Our physicians and nurses honor that trust, and truly appreciate the opportunity to care for you and your loved ones.      We also value your feedback. If you receive a survey about your Emergency Department experience today, please fill it out.  We care about our patients' feedback, and we listen to what you have to say. Thank you.    Dr. Flako Malik M.D.      ____________________________________________________________________  I have included a copy of your lab results and/or radiologic studies from today's visit so you can have them easily available at your follow-up visit. We hope you feel better and please do not hesitate to contact the ED if you have any questions at all!    No results found for this or any previous visit (from the past 12 hour(s)).    XR ANKLE LEFT (MIN 3 VIEWS)   Final Result   No acute abnormality.        [unfilled]  The exam and treatment you received in the Emergency Department were for an urgent problem and are not intended as complete care. It is important that you follow up with a doctor, nurse practitioner, or physician assistant for ongoing care. If your symptoms become worse or you do not improve as expected and you are unable to reach your usual health care provider, you should return to the Emergency Department. We are available 24 hours a day.    Please take your discharge instructions with you when you go to your follow-up appointment.     If a prescription has been provided, please have it filled as soon as possible to prevent a delay in treatment. Read the entire medication instruction sheet provided to you by the pharmacy. If you have any questions or reservations about taking the medication due to side effects or interactions with other medications, please call

## 2024-04-15 NOTE — ED NOTES
Discharge instructions were given to the patient by Fabienne SIEGEL .     The patient left the Emergency Department alert and oriented and in no acute distress with 1 prescriptions. The patient was encouraged to call or return to the ED for worsening issues or problems and was encouraged to schedule a follow up appointment for continuing care.     Ambulation assessment completed before discharge.  Pt left Emergency Department ambulating at baseline with crutches  Ortho device education: proper application, s/sx of poor perfusion, proper use, and RICE    The patient verbalized understanding of discharge instructions and prescriptions, all questions were answered. The patient has no further concerns at this time.
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
## 2024-04-25 ENCOUNTER — TELEPHONE (OUTPATIENT)
Age: 34
End: 2024-04-25

## 2024-04-25 NOTE — TELEPHONE ENCOUNTER
Attempted to call the patient to schedule an ED F/U appt however he did not answer and his VM box was not setup.

## 2024-10-24 ENCOUNTER — HOSPITAL ENCOUNTER (EMERGENCY)
Facility: HOSPITAL | Age: 34
Discharge: HOME | End: 2024-10-24
Attending: EMERGENCY MEDICINE

## 2024-10-24 VITALS
TEMPERATURE: 98 F | HEIGHT: 67 IN | OXYGEN SATURATION: 99 % | BODY MASS INDEX: 30.29 KG/M2 | SYSTOLIC BLOOD PRESSURE: 122 MMHG | RESPIRATION RATE: 20 BRPM | WEIGHT: 193 LBS | DIASTOLIC BLOOD PRESSURE: 56 MMHG | HEART RATE: 79 BPM

## 2024-10-24 DIAGNOSIS — M62.838 TRAPEZIUS MUSCLE SPASM: Primary | ICD-10-CM

## 2024-10-24 DIAGNOSIS — S46.911A STRAIN OF RIGHT SHOULDER, INITIAL ENCOUNTER: ICD-10-CM

## 2024-10-24 PROCEDURE — 6370000000 HC RX 637 (ALT 250 FOR IP): Performed by: EMERGENCY MEDICINE

## 2024-10-24 PROCEDURE — 99283 EMERGENCY DEPT VISIT LOW MDM: CPT

## 2024-10-24 RX ORDER — IBUPROFEN 600 MG/1
600 TABLET, FILM COATED ORAL
Status: COMPLETED | OUTPATIENT
Start: 2024-10-24 | End: 2024-10-24

## 2024-10-24 RX ORDER — LIDOCAINE 50 MG/G
1 PATCH TOPICAL DAILY
Qty: 10 PATCH | Refills: 0 | Status: SHIPPED | OUTPATIENT
Start: 2024-10-24 | End: 2024-11-03

## 2024-10-24 RX ORDER — LIDOCAINE 4 G/G
1 PATCH TOPICAL
Status: DISCONTINUED | OUTPATIENT
Start: 2024-10-24 | End: 2024-10-24 | Stop reason: HOSPADM

## 2024-10-24 RX ORDER — CYCLOBENZAPRINE HCL 10 MG
10 TABLET ORAL
Status: COMPLETED | OUTPATIENT
Start: 2024-10-24 | End: 2024-10-24

## 2024-10-24 RX ORDER — CYCLOBENZAPRINE HCL 10 MG
10 TABLET ORAL 3 TIMES DAILY PRN
Qty: 21 TABLET | Refills: 0 | Status: SHIPPED | OUTPATIENT
Start: 2024-10-24 | End: 2024-11-03

## 2024-10-24 RX ORDER — IBUPROFEN 800 MG/1
800 TABLET, FILM COATED ORAL EVERY 8 HOURS PRN
Qty: 20 TABLET | Refills: 0 | Status: SHIPPED | OUTPATIENT
Start: 2024-10-24

## 2024-10-24 RX ADMIN — CYCLOBENZAPRINE 10 MG: 10 TABLET, FILM COATED ORAL at 03:51

## 2024-10-24 RX ADMIN — IBUPROFEN 600 MG: 600 TABLET, FILM COATED ORAL at 03:51

## 2024-10-24 ASSESSMENT — PAIN DESCRIPTION - LOCATION: LOCATION: NECK;SHOULDER

## 2024-10-24 ASSESSMENT — PAIN - FUNCTIONAL ASSESSMENT: PAIN_FUNCTIONAL_ASSESSMENT: 0-10

## 2024-10-24 ASSESSMENT — PAIN DESCRIPTION - ORIENTATION: ORIENTATION: RIGHT

## 2024-10-24 ASSESSMENT — PAIN DESCRIPTION - PAIN TYPE: TYPE: ACUTE PAIN

## 2024-10-24 ASSESSMENT — PAIN SCALES - GENERAL: PAINLEVEL_OUTOF10: 4

## 2024-10-24 ASSESSMENT — PAIN DESCRIPTION - DESCRIPTORS: DESCRIPTORS: ACHING;SHARP

## 2024-10-24 NOTE — ED NOTES
Pt presents ambulatory to ED complaining of shoulder pain. Pt is alert and oriented x 4, RR even and unlabored, skin is warm and dry. Assesment completed and pt updated on plan of care.       Emergency Department Nursing Plan of Care       The Nursing Plan of Care is developed from the Nursing assessment and Emergency Department Attending provider initial evaluation.  The plan of care may be reviewed in the “ED Provider note”.    The Plan of Care was developed with the following considerations:   Patient / Family readiness to learn indicated by:verbalized understanding  Persons(s) to be included in education: patient  Barriers to Learning/Limitations:None    Signed     Paul Stark RN    10/24/2024   3:45 AM

## 2024-10-24 NOTE — ED PROVIDER NOTES
Pulse: 79   Resp: 20   Temp: 98 °F (36.7 °C)   TempSrc: Oral   SpO2: 99%   Weight: 87.5 kg (193 lb)   Height: 1.702 m (5' 7\")        Patient was given the following medications:  Medications   ibuprofen (ADVIL;MOTRIN) tablet 600 mg (has no administration in time range)   lidocaine 4 % external patch 1 patch (has no administration in time range)   cyclobenzaprine (FLEXERIL) tablet 10 mg (has no administration in time range)       Medical Decision Making  33-year-old male without significant past medical history who presents with a chief complaint of right shoulder pain.  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  He tells me that the shoulder felt tight.  Tells me that when he went to his nighttime job he was carrying some boards on his shoulder and the pain got worse extending up into the right side of his neck.  He denies any falls or direct trauma to the area.  Does not take any medications for his symptoms.  No numbness, tingling, or weakness in his extremities.    Patient is nontoxic-appearing on exam, hemodynamically stable.  Has tenderness palpation over the right trapezius with palpable spasm.  Suspect likely overuse injury/muscular strain.  Given that there was no direct trauma to the area I do not feel x-rays would be helpful as I have no suspicion for fracture at this time.  Will treat with muscle relaxers, anti-inflammatories, lidocaine patches    Problems Addressed:  Strain of right shoulder, initial encounter: acute illness or injury  Trapezius muscle spasm: acute illness or injury    Risk  OTC drugs.  Prescription drug management.          CLINICAL MANAGEMENT TOOLS:  Not Applicable                 FINAL IMPRESSION     1. Trapezius muscle spasm    2. Strain of right shoulder, initial encounter          DISPOSITION/PLAN   Rashad Campbell's  results have been reviewed with him.  He has been counseled regarding his diagnosis,

## 2024-10-24 NOTE — ED TRIAGE NOTES
Pt comes in ambulatory reporting R shoulder pain x tonight. 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

## 2024-10-24 NOTE — ED NOTES
Discharge instructions were given to the patient by RN.     The patient left the Emergency Department alert and oriented and in no acute distress with 3 prescriptions. The patient was encouraged to call or return to the ED for worsening issues or problems and was encouraged to schedule a follow up appointment for continuing care.     Ambulation assessment completed before discharge.  Pt left Emergency Department ambulating at baseline with no ortho devices  Ortho device education: none    The patient verbalized understanding of discharge instructions and prescriptions, all questions were answered. The patient has no further concerns at this time.